# Patient Record
Sex: FEMALE | Race: ASIAN | NOT HISPANIC OR LATINO | Employment: FULL TIME | URBAN - METROPOLITAN AREA
[De-identification: names, ages, dates, MRNs, and addresses within clinical notes are randomized per-mention and may not be internally consistent; named-entity substitution may affect disease eponyms.]

---

## 2023-06-27 ENCOUNTER — APPOINTMENT (OUTPATIENT)
Dept: ULTRASOUND IMAGING | Facility: CLINIC | Age: 48
End: 2023-06-27
Attending: EMERGENCY MEDICINE
Payer: COMMERCIAL

## 2023-06-27 ENCOUNTER — HOSPITAL ENCOUNTER (OUTPATIENT)
Facility: CLINIC | Age: 48
Setting detail: OBSERVATION
Discharge: HOME | End: 2023-06-28
Attending: EMERGENCY MEDICINE | Admitting: OBSTETRICS & GYNECOLOGY
Payer: COMMERCIAL

## 2023-06-27 DIAGNOSIS — D64.9 ANEMIA, UNSPECIFIED TYPE: ICD-10-CM

## 2023-06-27 LAB
ABO/RH(D): NORMAL
ANION GAP SERPL CALCULATED.3IONS-SCNC: 13 MMOL/L (ref 7–15)
ANTIBODY SCREEN: NEGATIVE
BASOPHILS # BLD AUTO: 0.1 10E3/UL (ref 0–0.2)
BASOPHILS NFR BLD AUTO: 1 %
BLD PROD TYP BPU: NORMAL
BLD PROD TYP BPU: NORMAL
BLOOD COMPONENT TYPE: NORMAL
BLOOD COMPONENT TYPE: NORMAL
BUN SERPL-MCNC: 6.7 MG/DL (ref 6–20)
CALCIUM SERPL-MCNC: 8.9 MG/DL (ref 8.6–10)
CHLORIDE SERPL-SCNC: 104 MMOL/L (ref 98–107)
CODING SYSTEM: NORMAL
CODING SYSTEM: NORMAL
CREAT SERPL-MCNC: 0.71 MG/DL (ref 0.51–0.95)
CROSSMATCH: NORMAL
CROSSMATCH: NORMAL
DEPRECATED HCO3 PLAS-SCNC: 25 MMOL/L (ref 22–29)
EOSINOPHIL # BLD AUTO: 0.2 10E3/UL (ref 0–0.7)
EOSINOPHIL NFR BLD AUTO: 2 %
ERYTHROCYTE [DISTWIDTH] IN BLOOD BY AUTOMATED COUNT: 13.6 % (ref 10–15)
GFR SERPL CREATININE-BSD FRML MDRD: >90 ML/MIN/1.73M2
GLUCOSE SERPL-MCNC: 92 MG/DL (ref 70–99)
HCT VFR BLD AUTO: 19.1 % (ref 35–47)
HGB BLD-MCNC: 6 G/DL (ref 11.7–15.7)
HOLD SPECIMEN: NORMAL
HOLD SPECIMEN: NORMAL
IMM GRANULOCYTES # BLD: 0 10E3/UL
IMM GRANULOCYTES NFR BLD: 0 %
ISSUE DATE AND TIME: NORMAL
ISSUE DATE AND TIME: NORMAL
LYMPHOCYTES # BLD AUTO: 3.2 10E3/UL (ref 0.8–5.3)
LYMPHOCYTES NFR BLD AUTO: 34 %
MCH RBC QN AUTO: 27.4 PG (ref 26.5–33)
MCHC RBC AUTO-ENTMCNC: 31.4 G/DL (ref 31.5–36.5)
MCV RBC AUTO: 87 FL (ref 78–100)
MONOCYTES # BLD AUTO: 1 10E3/UL (ref 0–1.3)
MONOCYTES NFR BLD AUTO: 11 %
NEUTROPHILS # BLD AUTO: 4.9 10E3/UL (ref 1.6–8.3)
NEUTROPHILS NFR BLD AUTO: 52 %
NRBC # BLD AUTO: 0 10E3/UL
NRBC BLD AUTO-RTO: 0 /100
PLATELET # BLD AUTO: 387 10E3/UL (ref 150–450)
POTASSIUM SERPL-SCNC: 3.2 MMOL/L (ref 3.4–5.3)
RBC # BLD AUTO: 2.19 10E6/UL (ref 3.8–5.2)
SODIUM SERPL-SCNC: 142 MMOL/L (ref 136–145)
SPECIMEN EXPIRATION DATE: NORMAL
UNIT ABO/RH: NORMAL
UNIT ABO/RH: NORMAL
UNIT NUMBER: NORMAL
UNIT NUMBER: NORMAL
UNIT STATUS: NORMAL
UNIT STATUS: NORMAL
UNIT TYPE ISBT: 5100
UNIT TYPE ISBT: 5100
WBC # BLD AUTO: 9.4 10E3/UL (ref 4–11)

## 2023-06-27 PROCEDURE — 36430 TRANSFUSION BLD/BLD COMPNT: CPT

## 2023-06-27 PROCEDURE — 250N000009 HC RX 250: Performed by: EMERGENCY MEDICINE

## 2023-06-27 PROCEDURE — 96360 HYDRATION IV INFUSION INIT: CPT

## 2023-06-27 PROCEDURE — 250N000013 HC RX MED GY IP 250 OP 250 PS 637: Performed by: OBSTETRICS & GYNECOLOGY

## 2023-06-27 PROCEDURE — 86923 COMPATIBILITY TEST ELECTRIC: CPT | Performed by: EMERGENCY MEDICINE

## 2023-06-27 PROCEDURE — 86901 BLOOD TYPING SEROLOGIC RH(D): CPT | Performed by: EMERGENCY MEDICINE

## 2023-06-27 PROCEDURE — 96374 THER/PROPH/DIAG INJ IV PUSH: CPT

## 2023-06-27 PROCEDURE — 250N000013 HC RX MED GY IP 250 OP 250 PS 637: Performed by: EMERGENCY MEDICINE

## 2023-06-27 PROCEDURE — 99285 EMERGENCY DEPT VISIT HI MDM: CPT | Mod: 25

## 2023-06-27 PROCEDURE — P9016 RBC LEUKOCYTES REDUCED: HCPCS | Performed by: EMERGENCY MEDICINE

## 2023-06-27 PROCEDURE — 258N000003 HC RX IP 258 OP 636: Performed by: EMERGENCY MEDICINE

## 2023-06-27 PROCEDURE — 80048 BASIC METABOLIC PNL TOTAL CA: CPT | Performed by: EMERGENCY MEDICINE

## 2023-06-27 PROCEDURE — 36415 COLL VENOUS BLD VENIPUNCTURE: CPT | Performed by: EMERGENCY MEDICINE

## 2023-06-27 PROCEDURE — G0378 HOSPITAL OBSERVATION PER HR: HCPCS

## 2023-06-27 PROCEDURE — 76830 TRANSVAGINAL US NON-OB: CPT

## 2023-06-27 PROCEDURE — 96375 TX/PRO/DX INJ NEW DRUG ADDON: CPT

## 2023-06-27 PROCEDURE — 250N000011 HC RX IP 250 OP 636: Mod: JZ | Performed by: OBSTETRICS & GYNECOLOGY

## 2023-06-27 PROCEDURE — 96361 HYDRATE IV INFUSION ADD-ON: CPT

## 2023-06-27 PROCEDURE — 86850 RBC ANTIBODY SCREEN: CPT | Performed by: EMERGENCY MEDICINE

## 2023-06-27 PROCEDURE — 85025 COMPLETE CBC W/AUTO DIFF WBC: CPT | Performed by: EMERGENCY MEDICINE

## 2023-06-27 RX ORDER — ACETAMINOPHEN 325 MG/1
975 TABLET ORAL EVERY 8 HOURS
Status: DISCONTINUED | OUTPATIENT
Start: 2023-06-27 | End: 2023-06-28 | Stop reason: HOSPADM

## 2023-06-27 RX ORDER — ONDANSETRON 4 MG/1
4 TABLET, ORALLY DISINTEGRATING ORAL EVERY 6 HOURS PRN
Status: DISCONTINUED | OUTPATIENT
Start: 2023-06-27 | End: 2023-06-28 | Stop reason: HOSPADM

## 2023-06-27 RX ORDER — ONDANSETRON 2 MG/ML
4 INJECTION INTRAMUSCULAR; INTRAVENOUS EVERY 6 HOURS PRN
Status: DISCONTINUED | OUTPATIENT
Start: 2023-06-27 | End: 2023-06-28 | Stop reason: HOSPADM

## 2023-06-27 RX ORDER — DOCUSATE SODIUM 100 MG/1
100 CAPSULE, LIQUID FILLED ORAL 2 TIMES DAILY
Status: DISCONTINUED | OUTPATIENT
Start: 2023-06-27 | End: 2023-06-28 | Stop reason: HOSPADM

## 2023-06-27 RX ORDER — IBUPROFEN 600 MG/1
600 TABLET, FILM COATED ORAL EVERY 6 HOURS PRN
Status: DISCONTINUED | OUTPATIENT
Start: 2023-06-27 | End: 2023-06-28 | Stop reason: HOSPADM

## 2023-06-27 RX ORDER — TRANEXAMIC ACID 10 MG/ML
1 INJECTION, SOLUTION INTRAVENOUS ONCE
Status: COMPLETED | OUTPATIENT
Start: 2023-06-27 | End: 2023-06-27

## 2023-06-27 RX ORDER — POTASSIUM CHLORIDE 1500 MG/1
40 TABLET, EXTENDED RELEASE ORAL ONCE
Status: COMPLETED | OUTPATIENT
Start: 2023-06-27 | End: 2023-06-27

## 2023-06-27 RX ORDER — TRANEXAMIC ACID 650 MG/1
1300 TABLET ORAL 3 TIMES DAILY
Status: DISCONTINUED | OUTPATIENT
Start: 2023-06-27 | End: 2023-06-28 | Stop reason: HOSPADM

## 2023-06-27 RX ADMIN — DOCUSATE SODIUM 100 MG: 100 CAPSULE, LIQUID FILLED ORAL at 22:20

## 2023-06-27 RX ADMIN — ONDANSETRON 4 MG: 2 INJECTION INTRAMUSCULAR; INTRAVENOUS at 22:01

## 2023-06-27 RX ADMIN — POTASSIUM CHLORIDE 40 MEQ: 1500 TABLET, EXTENDED RELEASE ORAL at 20:33

## 2023-06-27 RX ADMIN — SODIUM CHLORIDE 1000 ML: 9 INJECTION, SOLUTION INTRAVENOUS at 20:25

## 2023-06-27 RX ADMIN — TRANEXAMIC ACID 1300 MG: 650 TABLET ORAL at 23:19

## 2023-06-27 RX ADMIN — ACETAMINOPHEN 975 MG: 325 TABLET ORAL at 22:20

## 2023-06-27 RX ADMIN — TRANEXAMIC ACID 1 G: 10 INJECTION, SOLUTION INTRAVENOUS at 21:43

## 2023-06-27 ASSESSMENT — ACTIVITIES OF DAILY LIVING (ADL)
ADLS_ACUITY_SCORE: 35

## 2023-06-27 NOTE — ED PROVIDER NOTES
"  History     Chief Complaint:  Vaginal Bleeding       MAGGIE Billy is a 48 year old female who presents for evaluation of vaginal bleeding. About three weeks ago the patient started to develop new vaginal bleeding. This has been waxing and waning in severity since onset but she reports that at times she has bled through 12+ pads a day. Due to concern for her ongoing bleeding she initially went into her clinic today at which time she had a hgb of 3.9, and she was advised to come into the ED for evaluation. Here in the ED she reports feeling weak and fatigued with the sensation of a rapid heart rate. She notes that she is scheduled to see a new OBGYN next Thursday through [facility].     Independent Historian:    None - Patient Only    Review of External Notes:  PCP visit today - vaginal bleeding and headache. Hgb noted to be 5.9. Sent to ED    Allergies:  Sulfa Antibiotics     Physical Exam     Patient Vitals for the past 24 hrs:   BP Temp Temp src Pulse Resp SpO2 Height Weight   06/27/23 2300 115/71 98.1  F (36.7  C) Oral 78 18 -- -- --   06/27/23 2225 118/70 98.1  F (36.7  C) -- 75 18 -- -- --   06/27/23 2149 123/75 -- -- 106 -- 100 % -- --   06/27/23 2145 123/75 98.4  F (36.9  C) -- 110 16 100 % -- --   06/27/23 2120 -- -- -- -- -- 100 % -- --   06/27/23 2022 124/61 -- -- 120 -- 100 % -- --   06/27/23 1956 129/88 98.3  F (36.8  C) Oral 118 16 -- -- --   06/27/23 1941 114/63 98.6  F (37  C) -- 120 16 -- -- --   06/27/23 1816 (!) 160/86 97.6  F (36.4  C) Temporal (!) 140 20 100 % 1.575 m (5' 2\") 56 kg (123 lb 7.3 oz)        Physical Exam  Constitutional: Vital signs reviewed as above.   HENT:    Head: No external signs of trauma noted.   Eyes: Conjunctivae are pale. Pupils are equal, round, and reactive to light.   Cardiovascular:    Tachycardic rate, regular rhythm and normal heart sounds.     Exam reveals no friction rub.     No murmur heard.  Pulmonary/Chest:    Effort normal and breath sounds normal. "    No respiratory distress.    There are no wheezes.    There are no rales.   Gastrointestinal:    Soft.    Bowel sounds normal.    There is no distension.    There is no tenderness.    There is no rebound or guarding.   Musculoskeletal:    Normal range of motion.    Normal Tone  Neurological: Patient is alert and oriented to person, place, and time.   Skin: Skin is warm and dry. Patient is not diaphoretic  Psychiatric: The patient appears calm      Emergency Department Course     Imaging:  US Pelvic Complete with Transvaginal   Final Result   IMPRESSION:      1.  Suboptimally visualized endometrium. No discrete abnormalities of the uterus or endometrium are identified.      2.  Right ovary contains a simple dominant 2.8 cm follicle, which does not require follow-up.      3.  Left ovary is not visualized.      Report per radiology    Laboratory:  Labs Ordered and Resulted from Time of ED Arrival to Time of ED Departure   BASIC METABOLIC PANEL - Abnormal       Result Value    Sodium 142      Potassium 3.2 (*)     Chloride 104      Carbon Dioxide (CO2) 25      Anion Gap 13      Urea Nitrogen 6.7      Creatinine 0.71      Calcium 8.9      Glucose 92      GFR Estimate >90     CBC WITH PLATELETS AND DIFFERENTIAL - Abnormal    WBC Count 9.4      RBC Count 2.19 (*)     Hemoglobin 6.0 (*)     Hematocrit 19.1 (*)     MCV 87      MCH 27.4      MCHC 31.4 (*)     RDW 13.6      Platelet Count 387      % Neutrophils 52      % Lymphocytes 34      % Monocytes 11      % Eosinophils 2      % Basophils 1      % Immature Granulocytes 0      NRBCs per 100 WBC 0      Absolute Neutrophils 4.9      Absolute Lymphocytes 3.2      Absolute Monocytes 1.0      Absolute Eosinophils 0.2      Absolute Basophils 0.1      Absolute Immature Granulocytes 0.0      Absolute NRBCs 0.0     TYPE AND SCREEN, ADULT    ABO/RH(D) O POS      Antibody Screen Negative      SPECIMEN EXPIRATION DATE [id number]     PREPARE RED BLOOD CELLS (UNIT)    Blood  Component Type Red Blood Cells      Product Code F8444N83      Unit Status Transfused      Unit Number [id number]      CROSSMATCH Compatible      CODING SYSTEM [id number]      ISSUE DATE AND TIME 58059717562282      UNIT ABO/RH O+      UNIT TYPE ISBT 5100     PREPARE RED BLOOD CELLS (UNIT)    Blood Component Type Red Blood Cells      Product Code L0486V73      Unit Status Transfused      Unit Number [id number]      CROSSMATCH Compatible      CODING SYSTEM [id number]      ISSUE DATE AND TIME 89925531652724      UNIT ABO/RH O+      UNIT TYPE ISBT 5100     PREPARE RED BLOOD CELLS (UNIT)   TRANSFUSE RED BLOOD CELLS (UNIT)   TRANSFUSE RED BLOOD CELLS (UNIT)   ABO/RH TYPE AND SCREEN      Emergency Department Course & Assessments:     Interventions:  2025 NS 1,000 mL IV   2033 Potassium chloride 40 mEq PO   2143 Tranexamic acid 1 g IV   Red blood cells transfusion     Assessments, Independent Interpretation, Consult/Discussion of ManagementTests:  ED Course as of 06/27/23 2313 Tue Jun 27, 2023 1837 The patient was seen and evaluated.    2031 D/W Dr. Sanchez. OB/Gyn could admit.   2108 D/W [facility]. Ok for obs. Would like TXA IV as well.       Social Determinants of Health affecting care:  None    Disposition:  The patient was admitted to the hospital under the care of Dr. Morales.     Impression & Plan    CMS Diagnoses: None    Code Status: Full Code    Medical Decision Making:    This 48 year old female presents to the ED due to Vaginal Bleeding   . Please see the HPI and exam for specifics. A broad differential was considered including abnormal uterine bleeding, platelet dysfunction, uterine mass/cancer, etc..    Based on the differential, exam, and any decision tools, the above workup was undertaken. Lab and imaging results were reviewed by me and are notable for anemia.  Ultrasound did not demonstrate any obvious uterine lesion.    Management of these findings included medications as above and blood  transfusion.    Based on this, I felt that the most likely etiology of their symptoms is abnormal uterine bleeding leading to anemia.  The etiology of this could be perimenopause but further work-up can be pursued by gynecology in the outpatient setting.. I believe that a reasonable course of action is that they can be placed in observation to OB/Gyn.    Critical Care time:  was 0 minutes for this patient excluding procedures.    Diagnosis:    ICD-10-CM    1. Anemia, unspecified type  D64.9            I, Kentrell Rees, am serving as a scribe at 6:26 PM on 6/27/2023 to document services personally performed by Dr. Daigle, based on my observations and the provider's statements to me.     6/27/2023   Virgil Daigle, Virgil Fuller,   06/27/23 231

## 2023-06-27 NOTE — ED TRIAGE NOTES
Pt arrives to the ED due to having vaginal bleeding for the past 12 days. Pt state she had been going through 12+ pads a day. Less pads over past couple days. C/o dizziness/feeling lightheaded. Pt had blood work done today and got called for hgb of 3.9. Pt pale in triage.

## 2023-06-27 NOTE — LETTER
[facility] EMERGENCY DEPT  [address]  [facility] [address]  Phone: [phone]  Fax: [phone]    June 28, 2023        Elizabeth Billy  [id number] [facility] [place] [address]          To whom it may concern:    RE: Elizabeth Billy    Patient was seen and treated today at our clinic.  Patient may return to work 6/29/23 with the following:  No restrictions    Please contact me for questions or concerns.      Sincerely,      Tricia Agee MD on 6/28/2023 at 10:35 AM

## 2023-06-28 VITALS
DIASTOLIC BLOOD PRESSURE: 78 MMHG | WEIGHT: 123.46 LBS | SYSTOLIC BLOOD PRESSURE: 120 MMHG | OXYGEN SATURATION: 100 % | HEART RATE: 66 BPM | RESPIRATION RATE: 18 BRPM | BODY MASS INDEX: 22.72 KG/M2 | TEMPERATURE: 98.2 F | HEIGHT: 62 IN

## 2023-06-28 LAB
ALBUMIN SERPL BCG-MCNC: 3.5 G/DL (ref 3.5–5.2)
ALP SERPL-CCNC: 54 U/L (ref 35–104)
ALT SERPL W P-5'-P-CCNC: 18 U/L (ref 0–50)
ANION GAP SERPL CALCULATED.3IONS-SCNC: 8 MMOL/L (ref 7–15)
AST SERPL W P-5'-P-CCNC: 29 U/L (ref 0–45)
BILIRUB SERPL-MCNC: 0.8 MG/DL
BUN SERPL-MCNC: 6.1 MG/DL (ref 6–20)
CALCIUM SERPL-MCNC: 8.4 MG/DL (ref 8.6–10)
CHLORIDE SERPL-SCNC: 107 MMOL/L (ref 98–107)
CREAT SERPL-MCNC: 0.67 MG/DL (ref 0.51–0.95)
DEPRECATED HCO3 PLAS-SCNC: 24 MMOL/L (ref 22–29)
GFR SERPL CREATININE-BSD FRML MDRD: >90 ML/MIN/1.73M2
GLUCOSE SERPL-MCNC: 97 MG/DL (ref 70–99)
HGB BLD-MCNC: 8.7 G/DL (ref 11.7–15.7)
HOLD SPECIMEN: NORMAL
POTASSIUM SERPL-SCNC: 3.3 MMOL/L (ref 3.4–5.3)
PROT SERPL-MCNC: 6 G/DL (ref 6.4–8.3)
SODIUM SERPL-SCNC: 139 MMOL/L (ref 136–145)

## 2023-06-28 PROCEDURE — 80053 COMPREHEN METABOLIC PANEL: CPT | Performed by: OBSTETRICS & GYNECOLOGY

## 2023-06-28 PROCEDURE — 36415 COLL VENOUS BLD VENIPUNCTURE: CPT | Performed by: OBSTETRICS & GYNECOLOGY

## 2023-06-28 PROCEDURE — G0378 HOSPITAL OBSERVATION PER HR: HCPCS

## 2023-06-28 PROCEDURE — 85018 HEMOGLOBIN: CPT | Performed by: EMERGENCY MEDICINE

## 2023-06-28 RX ORDER — TRANEXAMIC ACID 650 MG/1
1300 TABLET ORAL 3 TIMES DAILY
Qty: 30 TABLET | Refills: 0 | Status: SHIPPED | OUTPATIENT
Start: 2023-06-28 | End: 2023-07-03

## 2023-06-28 ASSESSMENT — ACTIVITIES OF DAILY LIVING (ADL)
ADLS_ACUITY_SCORE: 35
ADLS_ACUITY_SCORE: 35
ADLS_ACUITY_SCORE: 18
ADLS_ACUITY_SCORE: 35

## 2023-06-28 NOTE — PROGRESS NOTES
[facility] Gyn Progress Note    Interval History   Doing well.  Pain is well-controlled.  No fevers.  Notes no further bleeding; only spotting with wiping. Good appetite.  Denies chest pain, shortness of breath, nausea or vomiting.  Ambulatory.  Desires to discharge home--described importance of starting rx for TXA and progesterone. Phone number for clinic provided.    Medications       acetaminophen  975 mg Oral Q8H     docusate sodium  100 mg Oral BID     tranexamic acid  1,300 mg Oral TID       Physical Exam   Temp: 98.5  F (36.9  C) Temp src: Oral BP: 126/86 Pulse: 70   Resp: 18 SpO2: 98 % O2 Device: None (Room air)    Vitals:    06/27/23 1816   Weight: 56 kg (123 lb 7.3 oz)     Vital Signs with Ranges  Temp:  [97.6  F (36.4  C)-98.6  F (37  C)] 98.5  F (36.9  C)  Pulse:  [] 70  Resp:  [16-20] 18  BP: (114-160)/(61-88) 126/86  SpO2:  [97 %-100 %] 98 %  I/O last 3 completed shifts:  In: 1042.92   Out: -     Constitutional:   awake, alert, cooperative, no apparent distress, and appears stated age     Lungs:   No increased work of breathing, good air exchange, clear to auscultation bilaterally, no crackles or wheezing     Cardiovascular:   Normal apical impulse, regular rate and rhythm, normal S1 and S2, no S3 or S4, and no murmur noted     Abdomen:   No scars, normal bowel sounds, soft, non-distended, non-tender, no masses palpated, no hepatosplenomegally     Neurologic:   Awake, alert, oriented to name, place and time.  Cranial nerves II-XII are grossly intact.  Motor is 5 out of 5 bilaterally.  Cerebellar finger to nose, heel to shin intact.  Sensory is intact.  Babinski down going, Romberg negative, and gait is normal.       Data   Recent Labs   Lab Test 06/27/23 1820   AS Negative     Recent Labs   Lab Test 06/28/23  0752 06/27/23 1820   HGB 8.7* 6.0*     No lab results found.    Assessment & Plan   -* No surgery found *     Ms. Elizabeth Billy is a 49 y/o who presents with  perimenopausal menorrhagia/AUB, with severe anemia    Anemia  -measured at 6.0 on arrival, though it measured 3.9 at her primary's office  -received 2U pRBCS, now 8.7  -continues to feel fine, asymptomatic, VSS  -only spotting now    Dispo  -has rx for progesterone, TXA, and agrees to use them  -has visit in [place] 7/6, and has been provided [facility] phone number as well  -discussed warning signs, when to return.    Tricia Agee MD

## 2023-06-28 NOTE — PLAN OF CARE
[facility]    ED Boarding Nurse Handoff Addendum Report:    Date/time: 6/28/2023, 7:09 AM    Activity Level: Independent    Fall Risk: No    Active Infusions: N/A    Current Meds Due: None    Current care needs: Hgb monitoring    Oxygen requirements (liters/min and/or FiO2):  N/A    Respiratory status: Room air    Vital signs (within last 30 minutes):    Vitals:    06/27/23 2300 06/28/23 0002 06/28/23 0023 06/28/23 0444   BP: 115/71 119/75 125/81 117/74   BP Location:  Right arm  Right arm   Pulse: 78 75 77 72   Resp: 18 18 18 18   Temp: 98.1  F (36.7  C) 98.1  F (36.7  C) 98.4  F (36.9  C) 98.3  F (36.8  C)   TempSrc: Oral Oral Oral Oral   SpO2: 100% 98% 97% 100%   Weight:       Height:           Focused assessment within last 30 minutes:    Pt is A+O X4. Pt denies any pain or shortness of breath and room air. Pt declined scheduled Tylenol. Pt is independent Plan: ongoing monitoring.    ED Boarding Nurse name: Beverly Mazariegos RN

## 2023-06-28 NOTE — PLAN OF CARE
[facility]    ED Boarding Nurse Handoff Addendum Report:    Date/time: 6/27/2023, 11:28 PM    Activity Level: independent    Fall Risk: No    Active Infusions: second unit of blood infusing at this time     Current Meds Due: none    Current care needs: none    Oxygen requirements (liters/min and/or FiO2): none    Respiratory status: Room air    Vital signs (within last 30 minutes):    Vitals:    06/27/23 2145 06/27/23 2149 06/27/23 2225 06/27/23 2300   BP: 123/75 123/75 118/70 115/71   Pulse: 110 106 75 78   Resp: 16  18 18   Temp: 98.4  F (36.9  C)  98.1  F (36.7  C) 98.1  F (36.7  C)   TempSrc:    Oral   SpO2: 100% 100%  100%   Weight:       Height:           Focused assessment within last 30 minutes:    Pt A&Ox4, VSS on RA. Denies pain. Reporting nausea from receiving IV tranexamic acid, given x1 zofran by previous RN, with reported relief.     ED Boarding Nurse name: Christian Jean-Baptiste RN

## 2023-06-28 NOTE — DISCHARGE SUMMARY
"[facility] Discharge Summary    Elizabeth Billy MRN# [id number]   Age: 48 year old YOB: 1975     Date of Admission:  6/27/2023  Date of Discharge::  6/28/2023 11:13 AM  Admitting Physician:  Vanessa Morales MD  Discharge Physician:  Tricia Agee MD     Home clinic: none          Admission Diagnoses:   Anemia, unspecified type [D64.9]  Abnormal uterine bleeding          Discharge Diagnosis:   Anemia, unspecified type [D64.9]  Abnormal uterine bleeding          Procedures:   Procedure(s): Transfusion 2 units pRBCs       No other procedures performed during this admission           Medications Prior to Admission:   (Not in a hospital admission)            Discharge Medications:     Discharge Medication List as of 6/28/2023 10:58 AM      START taking these medications    Details   tranexamic acid (LYSTEDA) 650 MG tablet Take 2 tablets (1,300 mg) by mouth 3 times daily for 5 days, Disp-30 tablet, R-0, E-Prescribe         CONTINUE these medications which have NOT CHANGED    Details   ferrous sulfate (SLO-FE) 142 (45 Fe) MG CR tablet Take 142 mg by mouth daily as needed (anemia), Historical                   Consultations:   No consultations were requested during this admission          Brief History of Labor or Admission:   Elizabeth Billy is a 48 year old perimenopausal who presented today to the ED sent from her PCP office visit due to severe anemia secondary to ABLA in the setting of vaginal bleeding. Pt states she was told her hgb was around 3 and that she needed a blood transfusion. States she developed AUB since about 3-4 weeks ago that has progressively got heavier to the point of needing to change her pad completely soaked every 2 hours. States she thought she could deal with that heavy bleeding for about 2 weeks without needing to see a doctor. States that today she felt that her head was throbbing and \"really sick\" wonders if this is related to her heavy bleeding. States that " today her bleeding is just spotting.     Her menses have been irregular in the last year. They come very other month, lasting 7 days and reported heavy throughout the first 5 days needing to change her pad every 3-4 hrs. Not feeling dizzy or LH. Menses before that were regular, monthly and lasting 5-7 days, moderate to light.            Hospital Course:   The patient's hospital course was unremarkable.  She received 2 units of pRBCs. She responded as anticipated and experienced no complications. On discharge, she denied pain. Vaginal bleeding is only light spotting.  Voiding without difficulty.  Ambulating well and tolerating a normal diet. She was discharged on hospital day 1.    Post-partum hemoglobin:   Hemoglobin   Date Value Ref Range Status   06/28/2023 8.7 (L) 11.7 - 15.7 g/dL Final             Discharge Instructions and Follow-Up:   Discharge diet: Regular   Discharge activity: Activity as tolerated   Discharge follow-up: Follow up with primary care provider in 1 week   Wound care: NA           Discharge Disposition:   Discharged to home       Tricia Agee MD

## 2023-06-28 NOTE — H&P
"[facility]    Consult Note  Obstetrics and Gynecology     Date of Admission:  6/27/2023  Date of Service (when I saw the patient): 06/28/23    Primary Care Physician   Physician No Ref-Primary    Reason for Consult   Reason for consult: AUB, ABLA-symptomatic    Chief Complaint   Vaginal bleeding  Lightheaded  palpiations    History is obtained from the patient and chart    History of Present Illness   Seam NOELLE Billy is a 48 year old perimenopausal who presented today to the ED sent from her PCP office visit due to severe anemia secondary to ABLA in the setting of vaginal bleeding. Pt states she was told her hgb was around 3 and that she needed a blood transfusion. States she developed AUB since about 3-4 weeks ago that has progressively got heavier to the point of needing to change her pad completely soaked every 2 hours. States she thought she could deal with that heavy bleeding for about 2 weeks without needing to see a doctor. States that today she felt that her head was throbbing and \"really sick\" wonders if this is related to her heavy bleeding. States that today her bleeding is just spotting.    Her menses have been irregular in the last year. They come very other month, lasting 7 days and reported heavy throughout the first 5 days needing to change her pad every 3-4 hrs. Not feeling dizzy or LH. Menses before that were regular, monthly and lasting 5-7 days, moderate to light.     HRT: denies    Personal or family hx of Gyn related cancers: denies    She does have an appointment set up with Gyn this next Thursday with [facility]        Past Medical History    Past medical history reviewed with no previously diagnosed medical problems.    Past Surgical History   Past surgical history reviewed with no previous surgeries identified.    Prior to Admission Medications   Prior to Admission Medications   Prescriptions Last Dose Informant Patient Reported? Taking? "   fluocin-hydroquinone-tretinoin 0.01-4-0.05 % CREA   No No   Sig: Apply to face at bedtime      Facility-Administered Medications: None     Allergies   Allergies   Allergen Reactions     Sulfa Antibiotics        Social History   I have personally reviewed the social history with the patient showing .    Family History   Family history reviewed with patient and is noncontributory.    Review of Systems   The 10 point Review of Systems is negative other than noted in the HPI or here.     Physical Exam   Temp: 98.4  F (36.9  C) Temp src: Oral BP: 123/75 Pulse: 106   Resp: 16 SpO2: 100 % O2 Device: None (Room air)    Vital Signs with Ranges  Temp:  [97.6  F (36.4  C)-98.6  F (37  C)] 98.4  F (36.9  C)  Pulse:  [106-140] 106  Resp:  [16-20] 16  BP: (114-160)/(61-88) 123/75  SpO2:  [100 %] 100 %  123 lbs 7.32 oz    Constitutional: Patient is alert.   HENT:   Mouth/Throat: Moist mucous membranes.  Eyes: EOM are normal.  Neck: normal ROM.   Cardiovascular: Warm and well perfused.   Pulmonary/Chest: Effort normal.   Abdominal: Non-distended.  Musculoskeletal: Normal range of motion.   Neurological: Moving all extremities.    Skin: Skin is warm and dry.   Psychiatric: Normal affect.        Data   Results for orders placed or performed during the hospital encounter of 06/27/23 (from the past 24 hour(s))   ABO/Rh type and screen    Narrative    The following orders were created for panel order ABO/Rh type and screen.  Procedure                               Abnormality         Status                     ---------                               -----------         ------                     Adult Type and Screen[[id number]]                            Edited Result - FINAL        Please view results for these tests on the individual orders.   CBC with Platelets & Differential    Narrative    The following orders were created for panel order CBC with Platelets & Differential.  Procedure                               Abnormality          Status                     ---------                               -----------         ------                     CBC with platelets and d...[[id number]]  Abnormal            Final result                 Please view results for these tests on the individual orders.   Basic metabolic panel   Result Value Ref Range    Sodium 142 136 - 145 mmol/L    Potassium 3.2 (L) 3.4 - 5.3 mmol/L    Chloride 104 98 - 107 mmol/L    Carbon Dioxide (CO2) 25 22 - 29 mmol/L    Anion Gap 13 7 - 15 mmol/L    Urea Nitrogen 6.7 6.0 - 20.0 mg/dL    Creatinine 0.71 0.51 - 0.95 mg/dL    Calcium 8.9 8.6 - 10.0 mg/dL    Glucose 92 70 - 99 mg/dL    GFR Estimate >90 >60 mL/min/1.73m2   [place] Draw    Narrative    The following orders were created for panel order [place] Draw.  Procedure                               Abnormality         Status                     ---------                               -----------         ------                     Extra Blue Top Tube[[id number]]                              Final result               Extra Red Top Tube[[id number]]                               Final result                 Please view results for these tests on the individual orders.   Adult Type and Screen   Result Value Ref Range    ABO/RH(D) O POS     Antibody Screen Negative Negative    SPECIMEN EXPIRATION DATE [id number]    CBC with platelets and differential   Result Value Ref Range    WBC Count 9.4 4.0 - 11.0 10e3/uL    RBC Count 2.19 (L) 3.80 - 5.20 10e6/uL    Hemoglobin 6.0 (LL) 11.7 - 15.7 g/dL    Hematocrit 19.1 (L) 35.0 - 47.0 %    MCV 87 78 - 100 fL    MCH 27.4 26.5 - 33.0 pg    MCHC 31.4 (L) 31.5 - 36.5 g/dL    RDW 13.6 10.0 - 15.0 %    Platelet Count 387 150 - 450 10e3/uL    % Neutrophils 52 %    % Lymphocytes 34 %    % Monocytes 11 %    % Eosinophils 2 %    % Basophils 1 %    % Immature Granulocytes 0 %    NRBCs per 100 WBC 0 <1 /100    Absolute Neutrophils 4.9 1.6 - 8.3 10e3/uL    Absolute Lymphocytes 3.2 0.8 - 5.3 10e3/uL     Absolute Monocytes 1.0 0.0 - 1.3 10e3/uL    Absolute Eosinophils 0.2 0.0 - 0.7 10e3/uL    Absolute Basophils 0.1 0.0 - 0.2 10e3/uL    Absolute Immature Granulocytes 0.0 <=0.4 10e3/uL    Absolute NRBCs 0.0 10e3/uL   Extra Blue Top Tube   Result Value Ref Range    Hold Specimen JIC    Extra Red Top Tube   Result Value Ref Range    Hold Specimen JIC    Prepare red blood cells (unit)   Result Value Ref Range    Blood Component Type Red Blood Cells     Product Code S1359J65     Unit Status Transfused     Unit Number [id number]     CROSSMATCH Compatible     CODING SYSTEM [id number]     ISSUE DATE AND TIME [id number]     UNIT ABO/RH O+     UNIT TYPE ISBT 5100    Prepare red blood cells (unit)   Result Value Ref Range    Blood Component Type Red Blood Cells     Product Code L5606G73     Unit Status Transfused     Unit Number [id number]     CROSSMATCH Compatible     CODING SYSTEM [id number]     ISSUE DATE AND TIME [id number]     UNIT ABO/RH O+     UNIT TYPE ISBT 5100    US Pelvic Complete with Transvaginal    Narrative    EXAM: US PELVIC TRANSABDOMINAL AND TRANSVAGINAL  LOCATION: [facility]  DATE: 6/27/2023    INDICATION: Vaginal bleeding. Anemia.  COMPARISON: None.  TECHNIQUE: Transabdominal scans were performed. Endovaginal ultrasound was performed to better visualize the adnexa.    FINDINGS:    UTERUS: 8.1 x 5.1 x 3.5 cm. Normal in size and position with no masses. Multiple small nabothian cysts within the cervix.    ENDOMETRIUM: Suboptimally visualized due to uterine position and best seen on the transabdominal images. Visualized portions of the endometrium are homogenous and measure approximately 8 mm in thickness. No discrete endometrial lesions.    RIGHT OVARY: 3.7 x 2.2 x 2.1 cm. Contains a simple dominant 2.8 cm follicle. Normal color flow is present within the surrounding ovarian parenchyma.    LEFT OVARY: Not visualized due to bowel gas and body habitus.    No significant free  fluid.      Impression    IMPRESSION:    1.  Suboptimally visualized endometrium. No discrete abnormalities of the uterus or endometrium are identified.    2.  Right ovary contains a simple dominant 2.8 cm follicle, which does not require follow-up.    3.  Left ovary is not visualized.     Assessment & Plan   Elizabeth Billy is a 48 year old perimenopausal female here with ABLA in the setting of AUB-O?     ABLA in the setting of AUB-O? (perimenopausal)  - currently getting 2 PRBC transfused  - will have CBC am and consider further transfusion if necessary  - will need to go home with PO iron every other day and iron rich diet, vit C  - has an appointment with Gyn but would like to consider establishing with out group (info will be in discharge paperwork, pt aware)   - will need EMB in light of no focal lesion found on PVUS (discussed today)   - discussed importance of ruling out hyperplasia VS neoplasia   - discussed to discharge home on PO TXA VS at least having Rx in order to prevent another episode of heavy bleeding  - all questions answered    Anticipate discharge home tomorrow am if hemodynamically stable and appropriate hgb level.       Dr. Lawrence Morales  [phone]

## 2023-06-28 NOTE — PHARMACY-ADMISSION MEDICATION HISTORY
Pharmacist Admission Medication History    Admission medication history is complete. The information provided in this note is only as accurate as the sources available at the time of the update.    Medication reconciliation/reorder completed by provider prior to medication history? No    Information Source(s): Patient and CareEverywhere/SureScripts via in-person    Pertinent Information: Doesn't take any products regularly. Takes iron supplement only occasionally.     Changes made to PTA medication list:    Added: slow -fe     Deleted: tretinoin cream    Changed: None    Medication Affordability:  Not including over the counter (OTC) medications, was there a time in the past 3 months when you did not take your medications as prescribed because of cost?: No    Allergies reviewed with patient and updates made in EHR: yes    Medication History Completed By: Kylie Ariza RPH 6/28/2023 8:25 AM      Prior to Admission medications    Medication Sig Last Dose Taking? Auth Provider Long Term End Date   ferrous sulfate (SLO-FE) 142 (45 Fe) MG CR tablet Take 142 mg by mouth daily as needed (anemia) Past Month Yes Unknown, Entered By History

## 2023-06-28 NOTE — PROGRESS NOTES
"Patient's After Visit Summary was reviewed with patient.  Patient verbalized understanding of After Visit Summary, recommended follow up and was given an opportunity to ask questions.   Discharge medications sent home with patient/family: No, sent to community pharmacy    Discharged with other: drove self to ER.     /78 (BP Location: Right arm)   Pulse 66   Temp 98.2  F (36.8  C) (Oral)   Resp 18   Ht 1.575 m (5' 2\")   Wt 56 kg (123 lb 7.3 oz)   LMP 06/27/2023   SpO2 100%   BMI 22.58 kg/m   PIV removed. Work note sent with.         "

## 2023-06-28 NOTE — PLAN OF CARE
PRIMARY DIAGNOSIS: VAGINAL BLEEDING  OUTPATIENT/OBSERVATION GOALS TO BE MET BEFORE DISCHARGE:  1. ADLs back to baseline: Yes    2. Activity and level of assistance: Ambulating independently.    3. Pain status: Pain free.    4. Return to near baseline physical activity: Yes     Discharge Planner Nurse   Safe discharge environment identified: Yes  Barriers to discharge: No       Entered by: Joan Navarrete RN 06/28/2023 10:45 AM     Please review provider order for any additional goals.   Nurse to notify provider when observation goals have been met and patient is ready for discharge.    AOx4, up ad zeferino, vitals stable, denies pain, denies N/V. Hgb 8.7 this morning, pt reports that vaginal bleeding has stopped. Seen by OB/GYN and cleared for discharge to home today.

## 2023-06-28 NOTE — ED NOTES
[facility]  ED Nurse Handoff Report    ED Chief complaint: Vaginal Bleeding  . ED Diagnosis:   Final diagnoses:   Anemia, unspecified type       Allergies:   Allergies   Allergen Reactions    Sulfa Antibiotics        Code Status: Full Code    Activity level - Baseline/Home:  independent.  Activity Level - Current:   independent.   Lift room needed: No.   Bariatric: No   Needed: No   Isolation: No.   Infection: Not Applicable.     Respiratory status: Room air    Vital Signs (within 30 minutes):   Vitals:    06/27/23 2300 06/28/23 0002 06/28/23 0023 06/28/23 0444   BP: 115/71 119/75 125/81 117/74   BP Location:  Right arm  Right arm   Pulse: 78 75 77 72   Resp: 18 18 18 18   Temp: 98.1  F (36.7  C) 98.1  F (36.7  C) 98.4  F (36.9  C) 98.3  F (36.8  C)   TempSrc: Oral Oral Oral Oral   SpO2: 100% 98% 97% 100%   Weight:       Height:           Cardiac Rhythm:  ,      Pain level:    Patient confused: No.   Patient Falls Risk: patient and family education.   Elimination Status: Has voided     Patient Report - Initial Complaint: Matt Billy is a 48 year old female who presents for evaluation of vaginal bleeding. About three weeks ago the patient started to develop new vaginal bleeding. This has been waxing and waning in severity since onset but she reports that at times she has bled through 12+ pads a day. Due to concern for her ongoing bleeding she initially went into her clinic today at which time she had a hgb of 3.9, and she was advised to come into the ED for evaluation. Here in the ED she reports feeling weak and fatigued with the sensation of a rapid heart rate. She notes that she is scheduled to see a new OBGYN next Thursday through [email or web address].       Focused Assessment:     Abnormal Results:   Labs Ordered and Resulted from Time of ED Arrival to Time of ED Departure   BASIC METABOLIC PANEL - Abnormal       Result Value    Sodium 142      Potassium 3.2 (*)     Chloride 104       Carbon Dioxide (CO2) 25      Anion Gap 13      Urea Nitrogen 6.7      Creatinine 0.71      Calcium 8.9      Glucose 92      GFR Estimate >90     CBC WITH PLATELETS AND DIFFERENTIAL - Abnormal    WBC Count 9.4      RBC Count 2.19 (*)     Hemoglobin 6.0 (*)     Hematocrit 19.1 (*)     MCV 87      MCH 27.4      MCHC 31.4 (*)     RDW 13.6      Platelet Count 387      % Neutrophils 52      % Lymphocytes 34      % Monocytes 11      % Eosinophils 2      % Basophils 1      % Immature Granulocytes 0      NRBCs per 100 WBC 0      Absolute Neutrophils 4.9      Absolute Lymphocytes 3.2      Absolute Monocytes 1.0      Absolute Eosinophils 0.2      Absolute Basophils 0.1      Absolute Immature Granulocytes 0.0      Absolute NRBCs 0.0     COMPREHENSIVE METABOLIC PANEL   HEMOGLOBIN   TYPE AND SCREEN, ADULT    ABO/RH(D) O POS      Antibody Screen Negative      SPECIMEN EXPIRATION DATE [id number]     PREPARE RED BLOOD CELLS (UNIT)    Blood Component Type Red Blood Cells      Product Code [id number]      Unit Status Transfused      Unit Number [id number]      CROSSMATCH Compatible      CODING SYSTEM DYJC820      ISSUE DATE AND TIME [id number]      UNIT ABO/RH O+      UNIT TYPE ISBT 5100     PREPARE RED BLOOD CELLS (UNIT)    Blood Component Type Red Blood Cells      Product Code F7940D58      Unit Status Transfused      Unit Number [id number]      CROSSMATCH Compatible      CODING SYSTEM AEJY377      ISSUE DATE AND TIME [id number]      UNIT ABO/RH O+      UNIT TYPE ISBT 5100     PREPARE RED BLOOD CELLS (UNIT)   TRANSFUSE RED BLOOD CELLS (UNIT)   TRANSFUSE RED BLOOD CELLS (UNIT)   ABO/RH TYPE AND SCREEN        US Pelvic Complete with Transvaginal   Final Result   IMPRESSION:      1.  Suboptimally visualized endometrium. No discrete abnormalities of the uterus or endometrium are identified.      2.  Right ovary contains a simple dominant 2.8 cm follicle, which does not require follow-up.      3.  Left ovary is not  visualized.          Treatments provided: Transfusion, Medication ( see MAR)  Family Comments:   OBS brochure/video discussed/provided to patient:  N/A  ED Medications:   Medications   melatonin tablet 1 mg (has no administration in time range)   ondansetron (ZOFRAN ODT) ODT tab 4 mg ( Oral See Alternative 6/27/23 2201)     Or   ondansetron (ZOFRAN) injection 4 mg (4 mg Intravenous $Given 6/27/23 2201)   acetaminophen (TYLENOL) tablet 975 mg (975 mg Oral Not Given 6/28/23 0609)   ibuprofen (ADVIL/MOTRIN) tablet 600 mg (has no administration in time range)   docusate sodium (COLACE) capsule 100 mg (100 mg Oral $Given 6/27/23 2220)   tranexamic acid (LYSTEDA) tablet 1,300 mg (1,300 mg Oral $Given 6/27/23 2319)   0.9% sodium chloride BOLUS (0 mLs Intravenous Stopped 6/27/23 2148)   potassium chloride ER (KLOR-CON M) CR tablet 40 mEq (40 mEq Oral $Given 6/27/23 2033)   tranexamic acid 1 g in 100 mL NS IV bag (premix) (1 g Intravenous $Given 6/27/23 2143)       Drips infusing:  No  For the majority of the shift this patient was Green.   Interventions performed were .    Sepsis treatment initiated: No    Cares/treatment/interventions/medications to be completed following ED care:     ED Nurse Name: Megan Rubin RN  8:11 AM

## 2024-05-23 ENCOUNTER — APPOINTMENT (OUTPATIENT)
Dept: CT IMAGING | Facility: CLINIC | Age: 49
End: 2024-05-23
Attending: EMERGENCY MEDICINE
Payer: COMMERCIAL

## 2024-05-23 ENCOUNTER — HOSPITAL ENCOUNTER (EMERGENCY)
Facility: CLINIC | Age: 49
Discharge: HOME | End: 2024-05-23
Attending: EMERGENCY MEDICINE | Admitting: EMERGENCY MEDICINE
Payer: COMMERCIAL

## 2024-05-23 VITALS
RESPIRATION RATE: 18 BRPM | BODY MASS INDEX: 22.7 KG/M2 | HEIGHT: 63 IN | SYSTOLIC BLOOD PRESSURE: 135 MMHG | HEART RATE: 78 BPM | OXYGEN SATURATION: 100 % | DIASTOLIC BLOOD PRESSURE: 69 MMHG | WEIGHT: 128.09 LBS | TEMPERATURE: 97 F

## 2024-05-23 DIAGNOSIS — N12 PYELONEPHRITIS: ICD-10-CM

## 2024-05-23 DIAGNOSIS — R31.9 HEMATURIA, UNSPECIFIED TYPE: ICD-10-CM

## 2024-05-23 LAB
ALBUMIN SERPL BCG-MCNC: 4.1 G/DL (ref 3.5–5.2)
ALBUMIN UR-MCNC: 100 MG/DL
ALP SERPL-CCNC: 94 U/L (ref 40–150)
ALT SERPL W P-5'-P-CCNC: 15 U/L (ref 0–50)
ANION GAP SERPL CALCULATED.3IONS-SCNC: 11 MMOL/L (ref 7–15)
APPEARANCE UR: ABNORMAL
AST SERPL W P-5'-P-CCNC: 17 U/L (ref 0–45)
BACTERIA #/AREA URNS HPF: ABNORMAL /HPF
BASOPHILS # BLD AUTO: 0.1 10E3/UL (ref 0–0.2)
BASOPHILS NFR BLD AUTO: 1 %
BILIRUB SERPL-MCNC: 0.5 MG/DL
BILIRUB UR QL STRIP: NEGATIVE
BUN SERPL-MCNC: 6.7 MG/DL (ref 6–20)
CALCIUM SERPL-MCNC: 10.6 MG/DL (ref 8.6–10)
CHLORIDE SERPL-SCNC: 101 MMOL/L (ref 98–107)
COLOR UR AUTO: YELLOW
CREAT SERPL-MCNC: 0.62 MG/DL (ref 0.51–0.95)
DEPRECATED HCO3 PLAS-SCNC: 23 MMOL/L (ref 22–29)
EGFRCR SERPLBLD CKD-EPI 2021: >90 ML/MIN/1.73M2
EOSINOPHIL # BLD AUTO: 0.1 10E3/UL (ref 0–0.7)
EOSINOPHIL NFR BLD AUTO: 1 %
ERYTHROCYTE [DISTWIDTH] IN BLOOD BY AUTOMATED COUNT: 13.6 % (ref 10–15)
GLUCOSE SERPL-MCNC: 96 MG/DL (ref 70–99)
GLUCOSE UR STRIP-MCNC: NEGATIVE MG/DL
HCT VFR BLD AUTO: 43.6 % (ref 35–47)
HGB BLD-MCNC: 13.9 G/DL (ref 11.7–15.7)
HGB UR QL STRIP: ABNORMAL
HOLD SPECIMEN: NORMAL
HOLD SPECIMEN: NORMAL
IMM GRANULOCYTES # BLD: 0 10E3/UL
IMM GRANULOCYTES NFR BLD: 0 %
KETONES UR STRIP-MCNC: NEGATIVE MG/DL
LEUKOCYTE ESTERASE UR QL STRIP: ABNORMAL
LYMPHOCYTES # BLD AUTO: 2 10E3/UL (ref 0.8–5.3)
LYMPHOCYTES NFR BLD AUTO: 22 %
MCH RBC QN AUTO: 29.3 PG (ref 26.5–33)
MCHC RBC AUTO-ENTMCNC: 31.9 G/DL (ref 31.5–36.5)
MCV RBC AUTO: 92 FL (ref 78–100)
MONOCYTES # BLD AUTO: 0.8 10E3/UL (ref 0–1.3)
MONOCYTES NFR BLD AUTO: 8 %
MUCOUS THREADS #/AREA URNS LPF: PRESENT /LPF
NEUTROPHILS # BLD AUTO: 6.2 10E3/UL (ref 1.6–8.3)
NEUTROPHILS NFR BLD AUTO: 68 %
NITRATE UR QL: POSITIVE
NRBC # BLD AUTO: 0 10E3/UL
NRBC BLD AUTO-RTO: 0 /100
PH UR STRIP: 6 [PH] (ref 5–7)
PLATELET # BLD AUTO: 267 10E3/UL (ref 150–450)
POTASSIUM SERPL-SCNC: 3.8 MMOL/L (ref 3.4–5.3)
PROT SERPL-MCNC: 9 G/DL (ref 6.4–8.3)
RBC # BLD AUTO: 4.74 10E6/UL (ref 3.8–5.2)
RBC URINE: 102 /HPF
SODIUM SERPL-SCNC: 135 MMOL/L (ref 135–145)
SP GR UR STRIP: 1.02 (ref 1–1.03)
SQUAMOUS EPITHELIAL: 1 /HPF
UROBILINOGEN UR STRIP-MCNC: NORMAL MG/DL
WBC # BLD AUTO: 9.1 10E3/UL (ref 4–11)
WBC URINE: 38 /HPF
YEAST #/AREA URNS HPF: ABNORMAL /HPF

## 2024-05-23 PROCEDURE — 85025 COMPLETE CBC W/AUTO DIFF WBC: CPT | Performed by: EMERGENCY MEDICINE

## 2024-05-23 PROCEDURE — 82040 ASSAY OF SERUM ALBUMIN: CPT | Performed by: EMERGENCY MEDICINE

## 2024-05-23 PROCEDURE — 99285 EMERGENCY DEPT VISIT HI MDM: CPT | Mod: 25

## 2024-05-23 PROCEDURE — 87186 SC STD MICRODIL/AGAR DIL: CPT | Performed by: EMERGENCY MEDICINE

## 2024-05-23 PROCEDURE — 81001 URINALYSIS AUTO W/SCOPE: CPT | Performed by: EMERGENCY MEDICINE

## 2024-05-23 PROCEDURE — 250N000011 HC RX IP 250 OP 636: Performed by: EMERGENCY MEDICINE

## 2024-05-23 PROCEDURE — 87086 URINE CULTURE/COLONY COUNT: CPT | Performed by: EMERGENCY MEDICINE

## 2024-05-23 PROCEDURE — 96365 THER/PROPH/DIAG IV INF INIT: CPT | Mod: 59

## 2024-05-23 PROCEDURE — 36415 COLL VENOUS BLD VENIPUNCTURE: CPT | Performed by: EMERGENCY MEDICINE

## 2024-05-23 PROCEDURE — 74177 CT ABD & PELVIS W/CONTRAST: CPT

## 2024-05-23 RX ORDER — CEFTRIAXONE 1 G/1
1 INJECTION, POWDER, FOR SOLUTION INTRAMUSCULAR; INTRAVENOUS ONCE
Status: COMPLETED | OUTPATIENT
Start: 2024-05-23 | End: 2024-05-23

## 2024-05-23 RX ORDER — IOPAMIDOL 755 MG/ML
500 INJECTION, SOLUTION INTRAVASCULAR ONCE
Status: COMPLETED | OUTPATIENT
Start: 2024-05-23 | End: 2024-05-23

## 2024-05-23 RX ORDER — CEPHALEXIN 500 MG/1
500 CAPSULE ORAL 4 TIMES DAILY
Qty: 28 CAPSULE | Refills: 0 | Status: SHIPPED | OUTPATIENT
Start: 2024-05-23 | End: 2024-05-30

## 2024-05-23 RX ADMIN — IOPAMIDOL 63 ML: 755 INJECTION, SOLUTION INTRAVENOUS at 17:57

## 2024-05-23 RX ADMIN — CEFTRIAXONE 1 G: 1 INJECTION, POWDER, FOR SOLUTION INTRAMUSCULAR; INTRAVENOUS at 19:12

## 2024-05-23 ASSESSMENT — ACTIVITIES OF DAILY LIVING (ADL)
ADLS_ACUITY_SCORE: 35
ADLS_ACUITY_SCORE: 35
ADLS_ACUITY_SCORE: 33
ADLS_ACUITY_SCORE: 35

## 2024-05-23 ASSESSMENT — COLUMBIA-SUICIDE SEVERITY RATING SCALE - C-SSRS
1. IN THE PAST MONTH, HAVE YOU WISHED YOU WERE DEAD OR WISHED YOU COULD GO TO SLEEP AND NOT WAKE UP?: NO
6. HAVE YOU EVER DONE ANYTHING, STARTED TO DO ANYTHING, OR PREPARED TO DO ANYTHING TO END YOUR LIFE?: NO
2. HAVE YOU ACTUALLY HAD ANY THOUGHTS OF KILLING YOURSELF IN THE PAST MONTH?: NO

## 2024-05-23 NOTE — ED PROVIDER NOTES
"  Emergency Department Note      History of Present Illness     Chief Complaint  Abdominal Pain    HPI  Elizabeth Billy is a 49 year old female who presents to the ED for evaluation of abdominal pain.  She points to the right side as area pain.  Denies any right lower quadrant pain.  She states that she has a history of constipation that is unchanged but had a bowel movement yesterday.  She states that she has had no episodes of vomiting.  She has been eating and drinking normally.  She states that she told her coworkers her symptoms and they told her she needed to be evaluated for appendicitis.  She states that she is perimenopausal has had some vaginal bleeding.  Denies any pain or difficulty with urination.  No recent fevers.    Independent Historian  None    Review of External Notes  Reviewed nurse triage call from earlier today patient was instructed to come to the ER.  Past Medical History   Medical History and Problem List  Anemia  Genital Herpes  Hematuria  IgA nephropathy  Proteinuria    Medications  Slo-Fe  Dulocolax  Protonix   Valtrex    Surgical History   No pertinent surgical history.     Physical Exam   Patient Vitals for the past 24 hrs:   BP Temp Temp src Pulse Resp SpO2 Height Weight   05/23/24 1945 135/69 -- -- 78 -- 100 % -- --   05/23/24 1930 114/65 -- -- 77 -- 100 % -- --   05/23/24 1915 113/77 -- -- 79 -- 100 % -- --   05/23/24 1606 131/80 97  F (36.1  C) Temporal 89 18 98 % 1.588 m (5' 2.5\") 58.1 kg (128 lb 1.4 oz)     Physical Exam  Vitals reviewed.   Constitutional:       General: She is not in acute distress.     Appearance: She is not ill-appearing.   HENT:      Head: Normocephalic and atraumatic.   Eyes:      Extraocular Movements: Extraocular movements intact.   Cardiovascular:      Rate and Rhythm: Normal rate and regular rhythm.   Pulmonary:      Effort: Pulmonary effort is normal. No respiratory distress.   Abdominal:      Palpations: Abdomen is soft.      Tenderness: There is no " abdominal tenderness. There is no right CVA tenderness, left CVA tenderness, guarding or rebound. Negative signs include Cesar's sign, Rovsing's sign and McBurney's sign.   Musculoskeletal:      Cervical back: Normal range of motion.   Skin:     General: Skin is warm and dry.   Neurological:      Mental Status: She is alert and oriented to person, place, and time.      GCS: GCS eye subscore is 4. GCS verbal subscore is 5. GCS motor subscore is 6.   Psychiatric:         Behavior: Behavior normal.         Diagnostics   Lab Results   Labs Ordered and Resulted from Time of ED Arrival to Time of ED Departure   COMPREHENSIVE METABOLIC PANEL - Abnormal       Result Value    Sodium 135      Potassium 3.8      Carbon Dioxide (CO2) 23      Anion Gap 11      Urea Nitrogen 6.7      Creatinine 0.62      GFR Estimate >90      Calcium 10.6 (*)     Chloride 101      Glucose 96      Alkaline Phosphatase 94      AST 17      ALT 15      Protein Total 9.0 (*)     Albumin 4.1      Bilirubin Total 0.5     ROUTINE UA WITH MICROSCOPIC REFLEX TO CULTURE - Abnormal    Color Urine Yellow      Appearance Urine Slightly Cloudy (*)     Glucose Urine Negative      Bilirubin Urine Negative      Ketones Urine Negative      Specific Gravity Urine 1.020      Blood Urine Large (*)     pH Urine 6.0      Protein Albumin Urine 100 (*)     Urobilinogen Urine Normal      Nitrite Urine Positive (*)     Leukocyte Esterase Urine Small (*)     Bacteria Urine Moderate (*)     Budding Yeast Urine Few (*)     Mucus Urine Present (*)     RBC Urine 102 (*)     WBC Urine 38 (*)     Squamous Epithelials Urine 1     CBC WITH PLATELETS AND DIFFERENTIAL    WBC Count 9.1      RBC Count 4.74      Hemoglobin 13.9      Hematocrit 43.6      MCV 92      MCH 29.3      MCHC 31.9      RDW 13.6      Platelet Count 267      % Neutrophils 68      % Lymphocytes 22      % Monocytes 8      % Eosinophils 1      % Basophils 1      % Immature Granulocytes 0      NRBCs per 100 WBC 0       Absolute Neutrophils 6.2      Absolute Lymphocytes 2.0      Absolute Monocytes 0.8      Absolute Eosinophils 0.1      Absolute Basophils 0.1      Absolute Immature Granulocytes 0.0      Absolute NRBCs 0.0     URINE CULTURE       Imaging  CT Abdomen Pelvis w Contrast   Final Result   IMPRESSION:    1.  A 2 cm focus of heterogeneous enhancement in the upper pole of the right kidney is indeterminate. Differential considerations include solid renal neoplasm such as renal cell carcinoma, as well as focal pyelonephritis. Follow-up and/or urologic    consultation is recommended to ensure resolution.   2.  No other cause for right-sided pain is identified. No evidence for appendicitis.        Independent Interpretation  None  ED Course    Medications Administered  Medications   sodium chloride (PF) 0.9% PF flush 100 mL (56 mLs Intravenous $Given 5/23/24 1757)   iopamidol (ISOVUE-370) solution 500 mL (63 mLs Intravenous $Given 5/23/24 1757)   cefTRIAXone (ROCEPHIN) 1 g vial to attach to  mL bag for ADULTS or NS 50 mL bag for PEDS (0 g Intravenous Stopped 5/23/24 1955)       Procedures  Procedures     Discussion of Management  None    Social Determinants of Health adding to complexity of care  None    ED Course  ED Course as of 05/23/24 2207   Thu May 23, 2024   1828 Bacteria Urine(!): Moderate   1828 Leukocyte Esterase Urine(!): Small   1828 WBC Urine(!): 38   1900 I discussed results with the patient.  Discussed the blood seen in the urine she states she is spotting and is currently perimenopausal.  I discussed with her CT findings.  She states that she does follow a nephrologist for protein in her urine that she sees the nephrologist yearly.  Recommended that she follow-up with urology regarding findings on CT.  Discussed with her differential including pyelonephritis and renal cell carcinoma.     Medical Decision Making / Diagnosis   CMS Diagnoses: None    MIPS     None    BLANK  Elizabeth Billy is a 49 year old female who  presents today with right-sided pain for the last 2 days.  No recent fevers.  No nausea, vomiting.  Abdomen is soft and nontender all throughout.  Given the area of pain we will order CT scan to rule out appendicitis versus other intra-abdominal process.  UA showed small amount of blood, bacteria.  White count appears normal.  LFTs within normal limits.  BUN, creatinine at baseline.  Her CT scan showed hypodensity of the right kidney concerning for possible pyelonephritis.  Given the right-sided pain mostly to the side we will treat as pyelonephritis.  Patient given dose of Rocephin here.  Differential based on radiology report also includes renal cell carcinoma.  I did discuss my concerns with the patient.  She states that she does follow-up with nephrology on a yearly basis due to proteinuria.  Discussed with her the plan for urology referral for the hematuria and hypodensity seen on CT scan.  Discussed plan for discharge home with antibiotics.  Discussed with her care instructions and return precautions.  She verbalized understanding and agreement. All questions and concerns addressed at this time.       Disposition  The patient was discharged.     ICD-10 Codes:    ICD-10-CM    1. Hematuria, unspecified type  R31.9 Adult Urology  Referral      2. Pyelonephritis  N12 Adult Urology  Referral           Discharge Medications  Discharge Medication List as of 5/23/2024  7:55 PM        START taking these medications    Details   cephALEXin (KEFLEX) 500 MG capsule Take 1 capsule (500 mg) by mouth 4 times daily for 7 days, Disp-28 capsule, R-0, Local Print               Scribe Disclosure:  I, Mily Gary, am serving as a scribe at 6:29 PM on 5/23/2024 to document services personally performed by Aditi Liu DO based on my observations and the provider's statements to me.        Aditi Liu DO  05/23/24 0812

## 2024-05-23 NOTE — ED TRIAGE NOTES
Pt presents with R sided abd pain since Monday. Denies abd surgeries besides c sections. No meds pta. Pt reports feeling febrile on Sunday. Pt reports a few episodes of emesis this week but that is normal for her. Denies diarrhea. A & Ox4.     Triage Assessment (Adult)       Row Name 05/23/24 3854          Triage Assessment    Airway WDL WDL        Respiratory WDL    Respiratory WDL WDL        Skin Circulation/Temperature WDL    Skin Circulation/Temperature WDL WDL        Cardiac WDL    Cardiac WDL WDL        Peripheral/Neurovascular WDL    Peripheral Neurovascular WDL WDL        Cognitive/Neuro/Behavioral WDL    Cognitive/Neuro/Behavioral WDL WDL

## 2024-05-24 LAB — BACTERIA UR CULT: ABNORMAL

## 2024-05-24 NOTE — DISCHARGE INSTRUCTIONS
Your CAT scan showed a small hypodensity on your right kidney,please follow up for urology for evaluation of this. It maybe an infection but also could be a mass/cancer. It is important you follow up for repeat imaging    Follow-up with your primary care doctor in 1 to 2 days    Return to the ER for any worsening or concerning symptoms

## 2024-05-24 NOTE — ED NOTES
Pt presents with right sided pain r/t kidney. Pain started on Monday and had a fever on Sunday. Denies changes to urination. Currently going through menopause so she is spotting.

## 2024-05-25 ENCOUNTER — TELEPHONE (OUTPATIENT)
Dept: NURSING | Facility: CLINIC | Age: 49
End: 2024-05-25
Payer: COMMERCIAL

## 2024-05-25 NOTE — LETTER
May 25, 2024        [address]  [id number] [address]          Dear Elizabeth Billy:    You were seen in the [facility] Emergency Department at [facility] on 5/23/2024.  We are unable to reach you by phone, so we are sending you this letter.     It is important that you call [facility] Emergency Department lab result nurse at [phone], as we have information to relay to you AND/OR we MAY have to make some changes in your treatment.    Best time to call back is between 9AM and 5:30PM, 7 days a week.      Sincerely,     [facility] Emergency Department Lab Result RN  [phone]

## 2024-05-25 NOTE — TELEPHONE ENCOUNTER
[facility]    Reason for call: Lab Result Notification     Lab Result (including Rx patient on, if applicable).  If culture, copy of lab report at bottom.  Lab Result: See below    Prescribed cephALEXin (KEFLEX) 500 MG capsule QID x 7 days    Creatinine Level (mg/dl)   Creatinine   Date Value Ref Range Status   05/23/2024 0.62 0.51 - 0.95 mg/dL Final    Creatinine clearance (ml/min), if applicable    Serum creatinine: 0.62 mg/dL 05/23/24 1647  Estimated creatinine clearance: 98.5 mL/min     Patient's current Symptoms:   Unable to assess. Left message. Letter sent.     RN Recommendations/Instructions per [place] ED lab result protocol:   [facility] ED lab result protocol utilized: Urine culture      JAJA BROWNING RN

## 2024-05-28 ENCOUNTER — NURSE TRIAGE (OUTPATIENT)
Dept: NURSING | Facility: CLINIC | Age: 49
End: 2024-05-28
Payer: COMMERCIAL

## 2024-05-28 NOTE — TELEPHONE ENCOUNTER
Nurse Triage SBAR    Situation: Returning call to clinic.     Background:   -Patient calling  -It is okay to call back and leave a detailed message at this number:       Assessment: Pt returning call from nurse regarding recent ER visit.     No triage    Pt was given test result.  Denies questions.            Reason for Disposition    [1] Follow-up call to recent contact AND [2] information only call, no triage required    Protocols used: Information Only Call - No Triage-A-

## 2024-05-28 NOTE — TELEPHONE ENCOUNTER
[facility]    Reason for call: Lab Result Notification     Lab Result (including Rx patient on, if applicable).  If culture, copy of lab report at bottom.  Lab Result: Urine Culture    Creatinine Level (mg/dl)   Creatinine   Date Value Ref Range Status   05/23/2024 0.62 0.51 - 0.95 mg/dL Final    Creatinine clearance (ml/min), if applicable    Serum creatinine: 0.62 mg/dL 05/23/24 1647  Estimated creatinine clearance: 98.5 mL/min     Patient's current Symptoms:   Unable to assess, VM left for pt to call back.    RN Recommendations/Instructions per [place] ED lab result protocol:  Calling pt as she left  with ED Results Team  [facility] ED lab result protocol utilized: Urine Culture    Lorena Dwyer